# Patient Record
Sex: MALE | Race: WHITE | Employment: FULL TIME
[De-identification: names, ages, dates, MRNs, and addresses within clinical notes are randomized per-mention and may not be internally consistent; named-entity substitution may affect disease eponyms.]

---

## 2022-03-19 PROBLEM — I45.10 INCOMPLETE RBBB: Status: ACTIVE | Noted: 2018-07-03

## 2022-03-19 PROBLEM — R07.9 CHEST PAIN: Status: ACTIVE | Noted: 2018-07-03

## 2022-03-19 PROBLEM — G44.209 TENSION HEADACHE: Status: ACTIVE | Noted: 2017-12-20

## 2022-12-19 ENCOUNTER — OFFICE VISIT (OUTPATIENT)
Dept: FAMILY MEDICINE CLINIC | Facility: CLINIC | Age: 27
End: 2022-12-19
Payer: COMMERCIAL

## 2022-12-19 VITALS
OXYGEN SATURATION: 96 % | WEIGHT: 208.2 LBS | SYSTOLIC BLOOD PRESSURE: 118 MMHG | DIASTOLIC BLOOD PRESSURE: 68 MMHG | BODY MASS INDEX: 27.59 KG/M2 | HEIGHT: 73 IN | HEART RATE: 78 BPM

## 2022-12-19 DIAGNOSIS — G44.89 OTHER HEADACHE SYNDROME: Primary | ICD-10-CM

## 2022-12-19 PROCEDURE — 99204 OFFICE O/P NEW MOD 45 MIN: CPT | Performed by: FAMILY MEDICINE

## 2022-12-19 RX ORDER — PROPRANOLOL HYDROCHLORIDE 80 MG/1
80 CAPSULE, EXTENDED RELEASE ORAL DAILY
Qty: 30 CAPSULE | Refills: 2 | Status: SHIPPED | OUTPATIENT
Start: 2022-12-19 | End: 2023-01-18

## 2022-12-19 RX ORDER — PROMETHAZINE HYDROCHLORIDE 25 MG/1
25 TABLET ORAL EVERY 6 HOURS PRN
Qty: 20 TABLET | Refills: 2 | Status: SHIPPED | OUTPATIENT
Start: 2022-12-19

## 2022-12-19 ASSESSMENT — PATIENT HEALTH QUESTIONNAIRE - PHQ9
SUM OF ALL RESPONSES TO PHQ QUESTIONS 1-9: 0
SUM OF ALL RESPONSES TO PHQ9 QUESTIONS 1 & 2: 0
1. LITTLE INTEREST OR PLEASURE IN DOING THINGS: 0
SUM OF ALL RESPONSES TO PHQ QUESTIONS 1-9: 0
2. FEELING DOWN, DEPRESSED OR HOPELESS: 0

## 2022-12-19 ASSESSMENT — ENCOUNTER SYMPTOMS
STRIDOR: 0
COLOR CHANGE: 0
EYE DISCHARGE: 0
EYE ITCHING: 0
SHORTNESS OF BREATH: 0
DIARRHEA: 0
SINUS PAIN: 0
ABDOMINAL PAIN: 0
EYE PAIN: 0
ABDOMINAL DISTENTION: 0
VOMITING: 0
FACIAL SWELLING: 0
WHEEZING: 0
EYE REDNESS: 0
CHEST TIGHTNESS: 0
CONSTIPATION: 0
SORE THROAT: 0
BLOOD IN STOOL: 0
RHINORRHEA: 0
NAUSEA: 0
SINUS PRESSURE: 0
COUGH: 0
BACK PAIN: 0

## 2022-12-19 NOTE — PROGRESS NOTES
[address] [place] [address]  _______________________________________  Merced Duron MD                 [address] [place] [address] Drive, [address]. [place], [address] [facility]                                                                                    Phone: [phone]                                                                                    Fax: [phone]    Efren Penaloza is a 33 y.o. male who is seen for evaluation of   Chief Complaint   Patient presents with    Establish Care    Headache     Pt c/o severe headaches whenever he feels stressed. Rest, advil, tylenol do not help, but the headaches normally subside the next day. HPI:   Garrison Bran is a 32 y/o M with h/o tension h/a, here to establish care. Previous PCP Dr. Jeanett Fothergill. - c/o global h/a that seems to be triggered with stressful events, lack of eating, lack of sleep or prolonged sleep. Associated with nausea and photophobia. Wife usually gives him ibuprofen 400 mg, but does not work. Caffeine has had no effect on h/a as well. He typically gets into a dark room and tries to sleep. After rest, he usually wakes up h/a free. H/a started in high school and have worsened as an adult. He has noticed that if he feels that a h/a is coming, he can distract himself with a long discussion, which sometimes helps avoid progression of h/a. He states his PGF had similar h/a and found relief with BB. He denies any other neuro deficits or h/o head trauma. Dr. Kalie Preston note from 7/03/2018 and Dr. Sara Rose note from 7/26/2018 reviewed. Review of Systems:  Review of Systems   Constitutional:  Negative for activity change, appetite change, chills, diaphoresis, fatigue, fever and unexpected weight change. HENT:  Negative for congestion, ear discharge, ear pain, facial swelling, hearing loss, mouth sores, nosebleeds, postnasal drip, rhinorrhea, sinus pressure, sinus pain, sore throat and tinnitus.     Eyes: Negative for pain, discharge, redness, itching and visual disturbance. Respiratory:  Negative for cough, chest tightness, shortness of breath, wheezing and stridor. Cardiovascular:  Negative for chest pain, palpitations and leg swelling. Gastrointestinal:  Negative for abdominal distention, abdominal pain, blood in stool, constipation, diarrhea, nausea and vomiting. Endocrine: Negative for cold intolerance, heat intolerance, polydipsia, polyphagia and polyuria. Genitourinary:  Negative for decreased urine volume, difficulty urinating, dysuria, flank pain, frequency, hematuria and urgency. Musculoskeletal:  Negative for arthralgias, back pain, gait problem, joint swelling, myalgias and neck pain. Skin:  Negative for color change, pallor, rash and wound. Neurological:  Positive for headaches. Negative for dizziness, tremors, seizures, syncope, facial asymmetry, speech difficulty, weakness, light-headedness and numbness. Psychiatric/Behavioral:  Negative for agitation, behavioral problems, confusion, hallucinations, self-injury, sleep disturbance and suicidal ideas. The patient is not nervous/anxious. History:  History reviewed. No pertinent past medical history. Past Surgical History:   Procedure Laterality Date    APPENDECTOMY         History reviewed. No pertinent family history. Social History     Tobacco Use    Smoking status: Never    Smokeless tobacco: Never   Substance Use Topics    Alcohol use: Not Currently     Comment: very rarely       No Known Allergies    Current Outpatient Medications   Medication Sig Dispense Refill    propranolol (INDERAL LA) 80 MG extended release capsule Take 1 capsule by mouth daily 30 capsule 2    promethazine (PHENERGAN) 25 MG tablet Take 1 tablet by mouth every 6 hours as needed for Nausea 20 tablet 2     No current facility-administered medications for this visit.        Vitals:    /68   Pulse 78   Ht 6' 1.25\" (1.861 m)   Wt 208 lb 3.2 oz (94.4 kg)   SpO2 96%   BMI 27.28 kg/m²     Physical Exam:  Physical Exam  Vitals reviewed. Constitutional:       General: He is not in acute distress. Appearance: Normal appearance. He is not ill-appearing, toxic-appearing or diaphoretic. HENT:      Head: Normocephalic and atraumatic. Right Ear: Tympanic membrane, ear canal and external ear normal. There is no impacted cerumen. Left Ear: Tympanic membrane, ear canal and external ear normal. There is no impacted cerumen. Nose: Nose normal. No congestion or rhinorrhea. Mouth/Throat:      Mouth: Mucous membranes are moist.      Pharynx: No oropharyngeal exudate or posterior oropharyngeal erythema. Eyes:      General: No scleral icterus. Right eye: No discharge. Left eye: No discharge. Extraocular Movements: Extraocular movements intact. Conjunctiva/sclera: Conjunctivae normal.      Pupils: Pupils are equal, round, and reactive to light. Cardiovascular:      Rate and Rhythm: Normal rate and regular rhythm. Pulses: Normal pulses. Heart sounds: Normal heart sounds. No murmur heard. No friction rub. No gallop. Pulmonary:      Effort: Pulmonary effort is normal. No respiratory distress. Breath sounds: Normal breath sounds. No stridor. No wheezing, rhonchi or rales. Chest:      Chest wall: No tenderness. Abdominal:      General: Abdomen is flat. Bowel sounds are normal. There is no distension. Palpations: Abdomen is soft. There is no mass. Tenderness: There is no abdominal tenderness. There is no right CVA tenderness, left CVA tenderness, guarding or rebound. Musculoskeletal:         General: No swelling, tenderness, deformity or signs of injury. Cervical back: Neck supple. No rigidity or tenderness. Right lower leg: No edema. Left lower leg: No edema. Lymphadenopathy:      Cervical: No cervical adenopathy. Skin:     General: Skin is warm and dry. Coloration: Skin is not jaundiced or pale. Findings: No bruising, erythema, lesion or rash. Neurological:      General: No focal deficit present. Mental Status: He is alert. Mental status is at baseline. Motor: No weakness. Coordination: Coordination normal.      Gait: Gait normal.   Psychiatric:         Mood and Affect: Mood normal.         Behavior: Behavior normal.         Thought Content: Thought content normal.         Judgment: Judgment normal.       Assessment/Plan:     ICD-10-CM    1. Other headache syndrome  G44.89 propranolol (INDERAL LA) 80 MG extended release capsule     promethazine (PHENERGAN) 25 MG tablet           Problem List          Other    Other headache syndrome - Primary      Characteristics of h/a could be a more complex tension h/a vs atypical migraines. Given GFs response to BB, pt wishes to trial propranolol. There really does not seem to be enough evidence to start him on a trial of imitrex at this point. His cardiology visit in 2018 indicates that his RBBB noted on ekg at that time was a normal variant and his syncopal episode was 2/2 hypovolemia. Agree with trial on propranolol, although we may need to  response at the 3 month vishal. Considering his improvement with sleep and h/o nausea with h/a, will given him phenergan to take at onset. I have asked him to try excedrine migraine with his next h/a and if no response, notify me. Will consider starting him on a trial of imitrex at that point.           Relevant Medications    propranolol (INDERAL LA) 80 MG extended release capsule    promethazine (PHENERGAN) 25 MG tablet        Mitch Chauhan III, MD

## 2022-12-20 NOTE — ASSESSMENT & PLAN NOTE
Characteristics of h/a could be a more complex tension h/a vs atypical migraines. Given GFs response to BB, pt wishes to trial propranolol. There really does not seem to be enough evidence to start him on a trial of imitrex at this point. His cardiology visit in 2018 indicates that his RBBB noted on ekg at that time was a normal variant and his syncopal episode was 2/2 hypovolemia. Agree with trial on propranolol, although we may need to  response at the 3 month vishal. Considering his improvement with sleep and h/o nausea with h/a, will given him phenergan to take at onset. I have asked him to try excedrine migraine with his next h/a and if no response, notify me. Will consider starting him on a trial of imitrex at that point.